# Patient Record
Sex: MALE | Race: OTHER | Employment: OTHER | URBAN - METROPOLITAN AREA
[De-identification: names, ages, dates, MRNs, and addresses within clinical notes are randomized per-mention and may not be internally consistent; named-entity substitution may affect disease eponyms.]

---

## 2018-09-10 NOTE — PATIENT DISCUSSION
New Prescription: Ocuflox (ofloxacin): drops: 0.3% 1 drop four times a day as directed into affected eye 09-

## 2018-09-10 NOTE — PATIENT DISCUSSION
New Prescription: Acular (ketorolac): drops: 0.5% 1 drop four times a day as directed into affected eye 09-

## 2018-09-10 NOTE — PATIENT DISCUSSION
New Prescription: Pred Forte (prednisolone acetate): drops,suspension: 1% 1 drop four times a day into affected eye 09-

## 2018-09-20 NOTE — PATIENT DISCUSSION
Continue: Pred Forte (prednisolone acetate): drops,suspension: 1% 1 drop four times a day into affected eye 09-

## 2018-09-20 NOTE — PATIENT DISCUSSION
Continue: Ocuflox (ofloxacin): drops: 0.3% 1 drop four times a day as directed into affected eye 09-

## 2018-09-25 NOTE — PATIENT DISCUSSION
"S/P PCIOL OS: Implant looks good and centered. No sign of retinal detachment. Patient to continue post-op meds QID OS until seen by Dr. Heidi Gonzalez.  Explained that many patients experience the ""film inside the eye"" (or glare

## 2018-10-23 NOTE — PATIENT DISCUSSION
Stopped Today: Acular (ketorolac): drops: 0.5% 1 drop four times a day as directed into affected eye 09-

## 2018-12-20 NOTE — PATIENT DISCUSSION
BLEPHARITIS BUL: Erythromycin ointment BID for two weeks. Educated patient about lid hygiene (lid scrubs, warm compresses). [facility]

## 2018-12-20 NOTE — PATIENT DISCUSSION
New Prescription: erythromycin (erythromycin): ointment: 5 mg/gram (0.5 %) a small amount twice a day as directed into both eyes 12-

## 2018-12-20 NOTE — PATIENT DISCUSSION
"HEADACHE (behind eye): Patient notices that when bending forward, there seem to be a ""weight"" or shift in weight in the sinus area. Possible sinus infection. Recommend patient see PCP. Optic Nerve appears normal. IOPs within normal limits.  Other than mild blepharitis

## 2019-03-21 NOTE — PATIENT DISCUSSION
ACUTE ATOPIC (SEASONAL/ENVIRONMENTAL) CONJUNCTIVITIS OU: ARTIFICIAL TEARS QID OU. COLD COMPRESSES TID OU. ZADITOR OR ALAWAY BID OU. IF SYMPTOMS WORSEN OR DO NOT IMPROVE, WILL CONSIDER PRESCRIPTION ALLERGY EYE MEDICATION SUCH AS PATANOL/PATADAY/PAZEO.

## 2019-03-21 NOTE — PATIENT DISCUSSION
New Prescription: ketotifen fumarate (ketotifen fumarate): drops: 0.025% 1 drop twice a day as directed into both eyes 03-

## 2019-03-21 NOTE — PATIENT DISCUSSION
K SICCA OU / DRY EYE OU:  I have explained that dry eye syndrome may cause many ocular symptoms including irritation, burning, tearing, and blurry vision. Frequent high quality artificial tears will help relieve these symptoms. Recommend patient use over the counter artificial tears at least four times daily. The patient was given a list of quality artificial tears to choose from (Genteal, Systane, Refresh, TheraTears, Blink) and was advised not to use Visine or Clear Eyes. Advised patient that if symptoms of discomfort did not improve or if they worse, then the patient should return to clinic. Will continue to monitor.

## 2021-10-22 ENCOUNTER — NEW PATIENT (OUTPATIENT)
Dept: URBAN - METROPOLITAN AREA CLINIC 15 | Facility: CLINIC | Age: 66
End: 2021-10-22

## 2021-10-22 DIAGNOSIS — H25.12: ICD-10-CM

## 2021-10-22 DIAGNOSIS — H35.432: ICD-10-CM

## 2021-10-22 DIAGNOSIS — Z98.41: ICD-10-CM

## 2021-10-22 DIAGNOSIS — H26.491: ICD-10-CM

## 2021-10-22 PROCEDURE — 92004 COMPRE OPH EXAM NEW PT 1/>: CPT

## 2021-10-22 ASSESSMENT — VISUAL ACUITY
OS_SC: 20/50
OS_PH: 20/20
OU_SC: J1
OU_SC: 20/50
OD_SC: 20/70
OD_PH: 20/25

## 2021-10-22 ASSESSMENT — TONOMETRY
OS_IOP_MMHG: 20
OD_IOP_MMHG: 20

## 2021-10-22 ASSESSMENT — KERATOMETRY
OD_AXISANGLE2_DEGREES: 101
OD_K1POWER_DIOPTERS: 44.75
OD_K2POWER_DIOPTERS: 46
OS_K1POWER_DIOPTERS: 45
OS_AXISANGLE_DEGREES: 174
OD_AXISANGLE_DEGREES: 11
OS_K2POWER_DIOPTERS: 45.50
OS_AXISANGLE2_DEGREES: 84

## 2022-11-09 ENCOUNTER — ESTABLISHED PATIENT (OUTPATIENT)
Dept: URBAN - METROPOLITAN AREA CLINIC 15 | Facility: CLINIC | Age: 67
End: 2022-11-09

## 2022-11-09 DIAGNOSIS — Z98.41: ICD-10-CM

## 2022-11-09 DIAGNOSIS — H25.12: ICD-10-CM

## 2022-11-09 DIAGNOSIS — H35.432: ICD-10-CM

## 2022-11-09 DIAGNOSIS — H26.491: ICD-10-CM

## 2022-11-09 DIAGNOSIS — H52.4: ICD-10-CM

## 2022-11-09 PROCEDURE — 92015 DETERMINE REFRACTIVE STATE: CPT

## 2022-11-09 PROCEDURE — 99214 OFFICE O/P EST MOD 30 MIN: CPT

## 2022-11-09 ASSESSMENT — VISUAL ACUITY
OD_CC: 20/25
OS_CC: 20/20
OD_SC: J2
OS_SC: J2

## 2022-11-09 ASSESSMENT — TONOMETRY
OD_IOP_MMHG: 18
OS_IOP_MMHG: 19